# Patient Record
Sex: FEMALE
[De-identification: names, ages, dates, MRNs, and addresses within clinical notes are randomized per-mention and may not be internally consistent; named-entity substitution may affect disease eponyms.]

---

## 2024-04-17 ENCOUNTER — RX ONLY (RX ONLY)
Age: 74
End: 2024-04-17

## 2024-04-17 ENCOUNTER — APPOINTMENT (OUTPATIENT)
Dept: URBAN - METROPOLITAN AREA CLINIC 186 | Age: 74
Setting detail: DERMATOLOGY
End: 2024-04-17

## 2024-04-17 DIAGNOSIS — L30.9 DERMATITIS, UNSPECIFIED: ICD-10-CM

## 2024-04-17 DIAGNOSIS — L85.3 XEROSIS CUTIS: ICD-10-CM

## 2024-04-17 DIAGNOSIS — L82.1 OTHER SEBORRHEIC KERATOSIS: ICD-10-CM

## 2024-04-17 PROCEDURE — 99203 OFFICE O/P NEW LOW 30 MIN: CPT

## 2024-04-17 PROCEDURE — OTHER REASSURANCE: OTHER

## 2024-04-17 PROCEDURE — OTHER DIAGNOSIS COMMENT: OTHER

## 2024-04-17 PROCEDURE — OTHER PRESCRIPTION MEDICATION MANAGEMENT: OTHER

## 2024-04-17 PROCEDURE — OTHER GENTLE SKIN CARE INSTRUCTIONS: OTHER

## 2024-04-17 PROCEDURE — OTHER TREATMENT REGIMEN: OTHER

## 2024-04-17 PROCEDURE — OTHER COUNSELING: OTHER

## 2024-04-17 PROCEDURE — OTHER PRESCRIPTION: OTHER

## 2024-04-17 PROCEDURE — OTHER EDUCATIONAL RESOURCES PROVIDED: OTHER

## 2024-04-17 RX ORDER — DESONIDE 0.5 MG/G
CREAM TOPICAL BID
Qty: 60 | Refills: 2 | Status: CANCELLED | COMMUNITY
Start: 2024-04-17

## 2024-04-17 RX ORDER — DESONIDE 0.5 MG/G
CREAM TOPICAL BID
Qty: 60 | Refills: 2 | Status: ERX

## 2024-04-17 ASSESSMENT — LOCATION DETAILED DESCRIPTION DERM
LOCATION DETAILED: SUPERIOR THORACIC SPINE
LOCATION DETAILED: LEFT SUPERIOR CRUS OF ANTIHELIX

## 2024-04-17 ASSESSMENT — LOCATION ZONE DERM
LOCATION ZONE: EAR
LOCATION ZONE: TRUNK

## 2024-04-17 ASSESSMENT — LOCATION SIMPLE DESCRIPTION DERM
LOCATION SIMPLE: UPPER BACK
LOCATION SIMPLE: LEFT EAR

## 2024-04-17 NOTE — HPI: PSORIASIS (PATIENT REPORTED)
Do You Have A Family History Of Psoriasis?: no
Do You Have Joint Pains?: yes
Where Is Your Psoriasis Located?: Scalp and elbows both ears
Additional History: Pt suspects she has psoriasis. Pt also hx arthritis although she hasn’t been dx with psoriatic arthritis.

## 2024-04-17 NOTE — PROCEDURE: PRESCRIPTION MEDICATION MANAGEMENT
Initiate Treatment: Desonide
Discontinue Regimen: All scented cleansers and moisturizers
Render In Strict Bullet Format?: No
Detail Level: Zone

## 2024-04-17 NOTE — PROCEDURE: COUNSELING
Detail Level: Generalized
Patient Specific Counseling (Will Not Stick From Patient To Patient): Follow recommendations for unspecified dermatitis
Detail Level: Detailed

## 2024-04-17 NOTE — PROCEDURE: DIAGNOSIS COMMENT
Comment: Clinically lesion on the forehead is excoriated and now just a healing excoriation although large . Patient admits to chronically scratching the areas. Elbows more xerotic no sign of other active areas.  Discussed difficult to discern etiology today without active area.  Discussed potential ddx including psoriasis and eczematous derm.  States has had success with otc cortisone.   Does complain of joint pain. Uses bath and Body.  Will switch skin care desonide bid prn for flare and will reevaluate in a few months sooner if flared.  Also recommend rheumatologist for joint symptoms.
Render Risk Assessment In Note?: no
Detail Level: Simple